# Patient Record
Sex: FEMALE | Race: WHITE
[De-identification: names, ages, dates, MRNs, and addresses within clinical notes are randomized per-mention and may not be internally consistent; named-entity substitution may affect disease eponyms.]

---

## 2017-04-04 ENCOUNTER — HOSPITAL ENCOUNTER (INPATIENT)
Dept: HOSPITAL 1 - ED | Age: 62
LOS: 2 days | Discharge: HOME | DRG: 691 | End: 2017-04-06
Attending: FAMILY MEDICINE | Admitting: FAMILY MEDICINE
Payer: MEDICAID

## 2017-04-04 VITALS — SYSTOLIC BLOOD PRESSURE: 143 MMHG | DIASTOLIC BLOOD PRESSURE: 78 MMHG

## 2017-04-04 VITALS — DIASTOLIC BLOOD PRESSURE: 75 MMHG | SYSTOLIC BLOOD PRESSURE: 136 MMHG

## 2017-04-04 VITALS — SYSTOLIC BLOOD PRESSURE: 136 MMHG | DIASTOLIC BLOOD PRESSURE: 75 MMHG

## 2017-04-04 VITALS — SYSTOLIC BLOOD PRESSURE: 140 MMHG | DIASTOLIC BLOOD PRESSURE: 77 MMHG

## 2017-04-04 VITALS — HEIGHT: 56 IN | BODY MASS INDEX: 23.9 KG/M2 | WEIGHT: 106.24 LBS

## 2017-04-04 VITALS — DIASTOLIC BLOOD PRESSURE: 71 MMHG | SYSTOLIC BLOOD PRESSURE: 132 MMHG

## 2017-04-04 VITALS — DIASTOLIC BLOOD PRESSURE: 86 MMHG | SYSTOLIC BLOOD PRESSURE: 151 MMHG

## 2017-04-04 VITALS — DIASTOLIC BLOOD PRESSURE: 74 MMHG | SYSTOLIC BLOOD PRESSURE: 135 MMHG

## 2017-04-04 VITALS — SYSTOLIC BLOOD PRESSURE: 133 MMHG | DIASTOLIC BLOOD PRESSURE: 84 MMHG

## 2017-04-04 VITALS — SYSTOLIC BLOOD PRESSURE: 151 MMHG | DIASTOLIC BLOOD PRESSURE: 86 MMHG

## 2017-04-04 DIAGNOSIS — R31.9: ICD-10-CM

## 2017-04-04 DIAGNOSIS — I12.9: ICD-10-CM

## 2017-04-04 DIAGNOSIS — N17.0: ICD-10-CM

## 2017-04-04 DIAGNOSIS — N39.0: ICD-10-CM

## 2017-04-04 DIAGNOSIS — E83.42: ICD-10-CM

## 2017-04-04 DIAGNOSIS — C83.39: Primary | ICD-10-CM

## 2017-04-04 DIAGNOSIS — E44.0: ICD-10-CM

## 2017-04-04 DIAGNOSIS — L97.519: ICD-10-CM

## 2017-04-04 DIAGNOSIS — B43.2: ICD-10-CM

## 2017-04-04 DIAGNOSIS — F32.9: ICD-10-CM

## 2017-04-04 DIAGNOSIS — Z94.0: ICD-10-CM

## 2017-04-04 DIAGNOSIS — Z90.5: ICD-10-CM

## 2017-04-04 DIAGNOSIS — D12.3: ICD-10-CM

## 2017-04-04 DIAGNOSIS — N18.9: ICD-10-CM

## 2017-04-04 DIAGNOSIS — K21.9: ICD-10-CM

## 2017-04-04 LAB
ALBUMIN SERPL-MCNC: 3 G/DL (ref 3.4–5)
ALP SERPL-CCNC: 92 U/L (ref 46–116)
ALT SERPL-CCNC: 19 U/L (ref 14–59)
AMPHETAMINES UR QL SCN: (no result)
AMYLASE SERPL-CCNC: 77 U/L (ref 25–115)
AST SERPL-CCNC: 14 U/L (ref 15–37)
BASOPHILS NFR BLD: 0.2 % (ref 0–2)
BILIRUB SERPL-MCNC: 0.29 MG/DL (ref 0.2–1)
BUN SERPL-MCNC: 32 MG/DL (ref 7–18)
CALCIUM SERPL-MCNC: 9 MG/DL (ref 8.5–10.1)
CHLORIDE SERPL-SCNC: 110 MMOL/L (ref 98–107)
CHOLEST SERPL-MCNC: 100 MG/DL (ref ?–200)
CHOLEST/HDLC SERPL: 2.9 MG/DL
CO2 SERPL-SCNC: 22.7 MMOL/L (ref 21–32)
CREAT SERPL-MCNC: 1.4 MG/DL (ref 0.6–1)
ERYTHROCYTE [DISTWIDTH] IN BLOOD BY AUTOMATED COUNT: 21 % (ref 11.5–14.5)
GFR SERPLBLD BASED ON 1.73 SQ M-ARVRAT: 40 ML/MIN
GLUCOSE SERPL-MCNC: 82 MG/DL (ref 74–106)
HDLC SERPL-MCNC: 34 MG/DL (ref 40–60)
IRON SERPL-MCNC: 43 UG/DL (ref 50–170)
LIPASE SERPL-CCNC: 222 IU/L (ref 73–393)
MAGNESIUM SERPL-MCNC: 1.5 MG/DL (ref 1.8–2.4)
MICROSCOPIC UR-IMP: YES
PHOSPHATE SERPL-MCNC: 2.9 MG/DL (ref 2.5–4.9)
PLATELET # BLD: 209 X10^3MCL (ref 130–400)
POTASSIUM SERPL-SCNC: 4.5 MMOL/L (ref 3.5–5.1)
PROT SERPL-MCNC: 5.7 G/DL (ref 6.4–8.2)
RBC # BLD AUTO: 2.24 M/MM3 (ref 4.1–5.1)
RBC # UR STRIP.AUTO: (no result) /UL
SODIUM SERPL-SCNC: 140 MMOL/L (ref 136–145)
T3 SERPL-MCNC: 0.89 NG/ML
T3RU NFR SERPL: 33 % UPTAKE (ref 30–39)
T4 FREE SERPL-MCNC: 1.2 NG/DL (ref 0.76–1.46)
T4 SERPL-MCNC: 7.1 UG/DL (ref 4.7–13.3)
T4/T3 UPTAKE INDEX SERPL: 2.3 UG/DL (ref 1.4–4.5)
TIBC SERPL-MCNC: 239 UG/DL (ref 250–450)
TRIGL SERPL-MCNC: 117 MG/DL (ref ?–150)
UA SPECIFIC GRAVITY: 1.01 (ref 1–1.03)

## 2017-04-04 PROCEDURE — P9016 RBC LEUKOCYTES REDUCED: HCPCS

## 2017-04-04 NOTE — NUR
RECEIVED PT FROM ER BY [facility] WITH ER NURSE AND PT'S . PT IS Nigerian
SPEAKING, A/O X4. PT NO COMPLAIN OF PAIN, REPORTED MILD DIZZINESS AT THIS
TIME. PT BREATHING ON RA, EVEN, UNLABORED. IV SITE PATENT, INTACT. WILL RESUME
IVF PER ORDER.

## 2017-04-04 NOTE — NUR
RECEIVED PT FROM ER BY [facility] WITH ER NURSE AND PT'S . PT IS Russian
SPEAKING, A/O X 4. PT NO COMPLAIN OF PAIN, REPORTED MILD DIZZINESS  AT THIS
TIME. PT BREATHING ON RA, EVEN, UNLABORED. IV SITE PATENT, INTACT. WILL RESUME
IVF PER ORDER.

## 2017-04-04 NOTE — NUR
RECEIVED 61 Y/O F FOR EVAL OF BLOOD IN STOOL THIS MORNING. PT STS HAD BRIGHT
RED BLOOD IN STOOL X3 TIMES THIS MORNING. DENIES PAIN DURING DEFECATION, DENIES
ABDOMINAL PAIN OR OTHER SYMPTOMS. ABDOMEN SOFT AND NONTENDER AT THIS TIME,
BOWEL SOUNDS ACTIVE X4 QUADRANTS. RESP E/U, NAD NOTED. PER DAUGHTER PT HAS
COLON CANCER BUT HAS NOT YET STARTED TREATMENT. AWAITING MSE.

## 2017-04-04 NOTE — NUR
RECEIVED PT LYING IN BED, AWAKE/ALERT AND VERBALLY RESPONSIVE. RESP IS EVEN
AND NOT LABORED, ON ROOM AIR. NO C/O DIFFICULTY BARETHING. NO C/O ANY PAIN. PT
IS SCHEDULED FOR COLONOSCOPY AND VOWEL PREP HAS STARTED. PT IS AWARE AND IS
DRINKING THE SOLUTION FOR THE BOWEL PREP. WILL CONTINUE TO MONITOR.

## 2017-04-04 NOTE — NUR
START 1ST UNIT BLOOD TRANSFUSION. PT IS AWAKE, ALERT, ORIENTED X 3. PT IS
EATING LUCN. PRE VS CHECKED: /75, HR 78 BPM, O2 % ON RA, TEMP
97.0.
PT HAD BM DIARRHEA, DARK STOOL WITH SOME BRIGHT RED NOTED. WILL CONTINUE TO
MONITOR.

## 2017-04-04 NOTE — NUR
PAGE GATE DR. PARDO ABOUT PT'S MG 1.5. WAITING FOR ORDER.
ORDER LAB [facility] AT 2300 TONIGHT FOR POST 2 UNITS BLOOD TRANSFUSION.
PT HAD BM, BRIGHT RED NOTED IN STOOL. IV SITE SALIN LOCK.

## 2017-04-04 NOTE — NUR
2ND UNIT BLOOD TRANSFUSION DONE. PT NO COMPLAIN OF DISCOMFORT. VS CHECKED:
TEMP 98.1, HR 75 BPM, /84, O2 SAT 99% ON RA. START BOWEL PREP FOR
TOMORROW COLONOSCOPY.

## 2017-04-04 NOTE — NUR
1ST UNIT BLOOD TRANSFUSION DONE. PT TOLERATE WELL. POST VS CHECKED: /71,
HR 76 BPM, TEMP 98.4, HR 76 BPM. WILL START 2 UNIT BLOOD TRANSUFSION PER
ORDER.

## 2017-04-04 NOTE — NUR
RECEIVED PT FROM NIGHT NURSE. PT IN STABLE CONDITION. RESP EVEN AND UNLABORED,
RA. VS STABLE AND WNL. NAD NOTED. NO C/O PAIN

## 2017-04-05 VITALS — DIASTOLIC BLOOD PRESSURE: 75 MMHG | SYSTOLIC BLOOD PRESSURE: 145 MMHG

## 2017-04-05 VITALS — DIASTOLIC BLOOD PRESSURE: 74 MMHG | SYSTOLIC BLOOD PRESSURE: 135 MMHG

## 2017-04-05 VITALS — DIASTOLIC BLOOD PRESSURE: 76 MMHG | SYSTOLIC BLOOD PRESSURE: 120 MMHG

## 2017-04-05 VITALS — SYSTOLIC BLOOD PRESSURE: 137 MMHG | DIASTOLIC BLOOD PRESSURE: 82 MMHG

## 2017-04-05 VITALS — DIASTOLIC BLOOD PRESSURE: 75 MMHG | SYSTOLIC BLOOD PRESSURE: 154 MMHG

## 2017-04-05 LAB
ALBUMIN SERPL-MCNC: 3.1 G/DL (ref 3.4–5)
BASOPHILS NFR BLD: 0.2 % (ref 0–2)
BASOPHILS NFR BLD: 0.2 % (ref 0–2)
BUN SERPL-MCNC: 19 MG/DL (ref 7–18)
CALCIUM SERPL-MCNC: 9.5 MG/DL (ref 8.5–10.1)
CHLORIDE SERPL-SCNC: 112 MMOL/L (ref 98–107)
CO2 SERPL-SCNC: 20.2 MMOL/L (ref 21–32)
CREAT SERPL-MCNC: 1.4 MG/DL (ref 0.6–1)
ERYTHROCYTE [DISTWIDTH] IN BLOOD BY AUTOMATED COUNT: 19.2 % (ref 11.5–14.5)
ERYTHROCYTE [DISTWIDTH] IN BLOOD BY AUTOMATED COUNT: 19.7 % (ref 11.5–14.5)
GFR SERPLBLD BASED ON 1.73 SQ M-ARVRAT: 40 ML/MIN
GLUCOSE SERPL-MCNC: 79 MG/DL (ref 74–106)
MAGNESIUM SERPL-MCNC: 2.1 MG/DL (ref 1.8–2.4)
PLATELET # BLD: 190 X10^3MCL (ref 130–400)
PLATELET # BLD: 199 X10^3MCL (ref 130–400)
POTASSIUM SERPL-SCNC: 4.5 MMOL/L (ref 3.5–5.1)
SODIUM SERPL-SCNC: 143 MMOL/L (ref 136–145)
TRANSFERRIN SERPL-MCNC: 206 MG/DL (ref 200–370)

## 2017-04-05 PROCEDURE — 0DBL8ZZ EXCISION OF TRANSVERSE COLON, VIA NATURAL OR ARTIFICIAL OPENING ENDOSCOPIC: ICD-10-PCS | Performed by: INTERNAL MEDICINE

## 2017-04-05 PROCEDURE — 0JBQ3ZX EXCISION OF RIGHT FOOT SUBCUTANEOUS TISSUE AND FASCIA, PERCUTANEOUS APPROACH, DIAGNOSTIC: ICD-10-PCS

## 2017-04-05 NOTE — NUR
MODERATE SEDATION CONSENT EXPLAINED TO THE PT WITH HELP IN TRANSLATION BY
CHAD CROOK. PT VERBALIZED UNDERSTANDING AND SIGNED.

## 2017-04-05 NOTE — NUR
PT BROUGHT DOWN TO GI LAB [facility]IA [facility] FOR THE SCHEDULED COLONOSCOPY
PT ALERT/AND VERBALLY RESPONSIVE.

## 2017-04-05 NOTE — NUR
(PODIATRY) CAME, EXPLAINED TO THE PT AND DID CONSENT FOR BIOPSY OF RT
HEEL SCAB WITH  PHONE AND STARTING PROCEDURE NOW WITH .
GAVE REPORT TO NEXT SHIFT NURSE STACI AND REQUESTED HER TO TAKE PICTURE
AFTER DOCTORS FINISH TAKE BIOPSY.

## 2017-04-05 NOTE — NUR
(PODIATRY) CAME AND DID BIOPSY OF RT HEEL SCAB .  ID CONSENT
WITH  PHONE AND DID BIOPSY AND SENT SPECIMEN TO LAB BY .

## 2017-04-05 NOTE — NUR
RECEIVED PATIENT IN BED AWAKE, ALERT AND ORIENTED Kyrgyz SPEAKING WITH NO
SIGN OF ACUTE DISTRESS NOTED. RESPIRATION EVENA NDNONLABOR WITH CLEAR BS
SATTING AT 98% RA. TELE#29 NSR ON MONITOR. SCD TO BLE IN PLACE. AV SHUNT LUE
WITH GOOD BRUIT AND THRILL. RT HEEL SCAB DRESSING CDI. WILL CONTINUE TO
MONITOR. CALL LIGHT WITHIN REACH.

## 2017-04-05 NOTE — NUR
RECEIVED PT IN BED.ASSESSED AND DOCUMENTED. GI STAFF CAME TO PICK PT AND WENT
TO GI LAB VIA [facility]UR[facility]. NIGHT NURSE GAVE REPORT TO GI NURSE ALREADY. VITAL SIGNS
STABLE.

## 2017-04-05 NOTE — NUR
PT IS SLEEPING. RESP IS EVEN AND NOT LABORED, ON ROOM AIR. IV IS [place]PL[place]. NO
DISTRESS NOTED. WILL CONTINUE TO MONITOR.

## 2017-04-05 NOTE — NUR
RECEIVED PT FROM GI LAB AFTER COLONOSCOPY. VITAL SIGNS STABLE. DENIES ANY
PAIN.SAFTEY PRECAUTIONS ON. WILL MONITOR.

## 2017-04-06 VITALS — SYSTOLIC BLOOD PRESSURE: 132 MMHG | DIASTOLIC BLOOD PRESSURE: 69 MMHG

## 2017-04-06 LAB
BASOPHILS NFR BLD: 0 % (ref 0–2)
BUN SERPL-MCNC: 22 MG/DL (ref 7–18)
CALCIUM SERPL-MCNC: 9.3 MG/DL (ref 8.5–10.1)
CHLORIDE SERPL-SCNC: 111 MMOL/L (ref 98–107)
CO2 SERPL-SCNC: 21.1 MMOL/L (ref 21–32)
CREAT SERPL-MCNC: 1.5 MG/DL (ref 0.6–1)
ERYTHROCYTE [DISTWIDTH] IN BLOOD BY AUTOMATED COUNT: 19.4 % (ref 11.5–14.5)
GFR SERPLBLD BASED ON 1.73 SQ M-ARVRAT: 37 ML/MIN
GLUCOSE SERPL-MCNC: 85 MG/DL (ref 74–106)
PLATELET # BLD: 203 X10^3MCL (ref 130–400)
POTASSIUM SERPL-SCNC: 4.9 MMOL/L (ref 3.5–5.1)
SODIUM SERPL-SCNC: 139 MMOL/L (ref 136–145)
URATE SERPL-MCNC: 7.4 MG/DL (ref 2.6–6)

## 2017-04-06 NOTE — NUR
AWAKE THIS TIME C/O PAIN TO RT FOOT AT SCALE OF 8/10 PER PATIENT MEDOCATED
WITH MORPHINE 2MG IVP AS PRESCRIBED. WILL CONTINUE TO MONITOR.

## 2017-04-06 NOTE — NUR
PHOTO OF ULCERS TO RT FOOT TAKEN AND DRESSING CHANGED. C/O RT FOOT PAIN X2
THROUGHOUT THE SHIFT AND MEDICATED AS PRESCRIBED. ALL NEEDS ATTENDED.

## 2017-04-06 NOTE — NUR
PT'S DAUGHTER IS HERE TO  PT.DISCHARGE INSTRUCTIONS GIVEN. PB SIGNED
AND SENT WITH PT.2 IV IN RFA AND TELE REMOVED. PT DENIES ANY PAIN. CNA TOOK PT
TO LOBBY VIA WHEELCHAIR ACCOMPANIED BY PT'S DAUGHTER. PT DC HOME.

## 2017-04-06 NOTE — NUR
RECEIVED PT IN BED.ASSESSED AND DOCUMENTED.  SPOKE WITH PT AND HE IS
DISCHARGING THE PT SOON. PT IS STABLE. DENIES ANY PAIN.

## 2017-08-30 ENCOUNTER — HOSPITAL ENCOUNTER (INPATIENT)
Dept: HOSPITAL 1 - ED | Age: 62
LOS: 6 days | Discharge: NURSING, REHAB OR LONG-TERM CARE | DRG: 254 | End: 2017-09-05
Attending: FAMILY MEDICINE | Admitting: FAMILY MEDICINE
Payer: MEDICAID

## 2017-08-30 VITALS — DIASTOLIC BLOOD PRESSURE: 57 MMHG | SYSTOLIC BLOOD PRESSURE: 109 MMHG

## 2017-08-30 VITALS — WEIGHT: 99.21 LBS | HEIGHT: 58 IN | BODY MASS INDEX: 20.82 KG/M2

## 2017-08-30 DIAGNOSIS — M62.50: ICD-10-CM

## 2017-08-30 DIAGNOSIS — I10: ICD-10-CM
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
## 2017-08-30 DIAGNOSIS — K21.9: ICD-10-CM

## 2017-08-30 DIAGNOSIS — E87.6: ICD-10-CM

## 2017-08-30 DIAGNOSIS — E87.1: ICD-10-CM

## 2017-08-30 DIAGNOSIS — E44.0: ICD-10-CM

## 2017-08-30 DIAGNOSIS — T38.6X5A: ICD-10-CM

## 2017-08-30 DIAGNOSIS — K92.81: Primary | ICD-10-CM

## 2017-08-30 DIAGNOSIS — E78.00: ICD-10-CM

## 2017-08-30 DIAGNOSIS — N17.0: ICD-10-CM

## 2017-08-30 DIAGNOSIS — E83.39: ICD-10-CM

## 2017-08-30 DIAGNOSIS — C83.39: ICD-10-CM

## 2017-08-30 DIAGNOSIS — E83.42: ICD-10-CM

## 2017-08-30 DIAGNOSIS — Z94.0: ICD-10-CM

## 2017-08-30 LAB
ALBUMIN SERPL-MCNC: 2.9 G/DL (ref 3.4–5)
ALP SERPL-CCNC: 85 U/L (ref 46–116)
ALT SERPL-CCNC: 18 U/L (ref 14–59)
AMYLASE SERPL-CCNC: 22 U/L (ref 25–115)
AST SERPL-CCNC: 8 U/L (ref 15–37)
BILIRUB SERPL-MCNC: 0.4 MG/DL (ref 0.2–1)
BUN SERPL-MCNC: 13 MG/DL (ref 7–18)
CALCIUM SERPL-MCNC: 8.9 MG/DL (ref 8.5–10.1)
CHLORIDE SERPL-SCNC: 100 MMOL/L (ref 98–107)
CHOLEST SERPL-MCNC: 100 MG/DL (ref ?–200)
CHOLEST/HDLC SERPL: 2.4 MG/DL
CK MB SERPL-MCNC: 0.5 NG/ML (ref 0–3.6)
CK SERPL-CCNC: 36 U/L (ref 26–192)
CO2 SERPL-SCNC: 17.7 MMOL/L (ref 21–32)
CREAT SERPL-MCNC: 1.2 MG/DL (ref 0.6–1)
CRP SERPL-MCNC: 13 MG/DL (ref ?–0.9)
DACRYOCYTES BLD QL SMEAR: (no result)
ERYTHROCYTE [DISTWIDTH] IN BLOOD BY AUTOMATED COUNT: 21.8 % (ref 11.5–14.5)
GFR SERPLBLD BASED ON 1.73 SQ M-ARVRAT: 48 ML/MIN
GLUCOSE SERPL-MCNC: 124 MG/DL (ref 74–106)
HDLC SERPL-MCNC: 41 MG/DL (ref 40–60)
LIPASE SERPL-CCNC: 74 IU/L (ref 73–393)
MAGNESIUM SERPL-MCNC: 1.2 MG/DL (ref 1.8–2.4)
MICROSCOPIC UR-IMP: YES
MONOCYTES NFR BLD: 63 % (ref 0–7)
NEUTS BAND NFR BLD: 4 % (ref 0–10)
NEUTS SEG NFR BLD MANUAL: 18 % (ref 37–75)
OVALOCYTES BLD QL SMEAR: (no result)
PATH REV BLD -IMP: YES
PHOSPHATE SERPL-MCNC: 1.8 MG/DL (ref 2.5–4.9)
PLAT MORPH BLD: (no result)
PLATELET # BLD: 57 X10^3MCL (ref 130–400)
POTASSIUM SERPL-SCNC: 3.3 MMOL/L (ref 3.5–5.1)
PROT SERPL-MCNC: 6.1 G/DL (ref 6.4–8.2)
RBC # UR STRIP.AUTO: NEGATIVE /UL
RBC MORPH BLD: (no result)
SODIUM SERPL-SCNC: 133 MMOL/L (ref 136–145)
T3RU NFR SERPL: 38 % UPTAKE (ref 30–39)
T4 FREE SERPL-MCNC: 1.34 NG/DL (ref 0.76–1.46)
T4 SERPL-MCNC: 9.5 UG/DL (ref 4.7–13.3)
T4/T3 UPTAKE INDEX SERPL: 3.6 UG/DL (ref 1.4–4.5)
TRIGL SERPL-MCNC: 118 MG/DL (ref ?–150)
UA SPECIFIC GRAVITY: 1 (ref 1–1.03)

## 2017-08-30 PROCEDURE — P9016 RBC LEUKOCYTES REDUCED: HCPCS

## 2017-08-30 NOTE — NUR
PT GIVEN MEDS PER DR BELTRE ORDERS. PT EDUCATED ON MEDS AND VERBALIZED
UNDERSTANDING OF TEACHING. PT DENIES ALLERGIES TO MEDS. PT AAOX4, BREATHING
EVEN AND UNLABORED. NO DISTRESS NOTED AT THIS TIME. PT PORT-CATH INTACT WITH NO
COMPLICATIONS NOTED AT THIS TIME.

## 2017-08-30 NOTE — NUR
PT CAME IN TO ED W/CC OF ABDNORMAL LAB RESULTS OF LOW WBC AS INFORMED BY PMD AS
WELL AS EPIGASTRIC ABD PAIN X1 DAY. PT AMBULATED TO [facility] W/STEADY GAIT. PT
HAS HX OF LFT BREAST MASECTOMY AND IS CURRENTLY BEING TREATED FOR CANCER IN THE
[facility]. PT RESPS E/U. NO S/S OF DISTRESS NOTED. COOLING MEASURES
IMPLEMENTED. PT ALSO REPORTS FEVER FOR "A FEW DAYS". PORT-A-CATH NOTED TO LFT
UPPER CHEST. DR. BELTRE AT BEDSIDE FOR MSE. WILL CONTINUE TO MONITOR.

## 2017-08-30 NOTE — NUR
DR GUTIERREZ IN THE ROOM SEEING THE PATIENT AND MADE AWARE OF PATIENT K+ LEVEL
3.3,WILL CONTINUE TO MONITOR.

## 2017-08-30 NOTE — NUR
PT IN BED WITH SIDERAILS UP FOR SAFETY. PT AAOX4, BREATHING EVEN AND UNLABORED.
NO DISTRESS NOTED AT THIS TIME. COMFORT MEASURES IN PLACED. CALL LIGHT PLACED
WITHIN REACH.

## 2017-08-30 NOTE — NUR
RECEIVED PT FROM ED VIA [facility], CAME IN DUE TO ABNORMAL LABS AND FEVER.
AAOX4. C/O DIZZINESS. NO SOB NOTED. DENIES CHEST PAIN/PRESSURE. DENIES
ABDOMINAL DISCOMFORT. PALE. PULSES ARE PALPABLE. W/ LEFT UPPER CHEST PORTACAT
ACCESSED IN ED, W/ GOOD BLOOD RETURN, DRESSING CDI. W/ SCATTERED PURPLISH
DISCOLORATION ON BUE. SIDE RAILS UPX2. CALL LIGHT ON REACH. ENDORSED

## 2017-08-31 VITALS — SYSTOLIC BLOOD PRESSURE: 119 MMHG | DIASTOLIC BLOOD PRESSURE: 64 MMHG

## 2017-08-31 VITALS — SYSTOLIC BLOOD PRESSURE: 122 MMHG | DIASTOLIC BLOOD PRESSURE: 62 MMHG

## 2017-08-31 VITALS — DIASTOLIC BLOOD PRESSURE: 62 MMHG | SYSTOLIC BLOOD PRESSURE: 122 MMHG

## 2017-08-31 VITALS — DIASTOLIC BLOOD PRESSURE: 53 MMHG | SYSTOLIC BLOOD PRESSURE: 103 MMHG

## 2017-08-31 VITALS — DIASTOLIC BLOOD PRESSURE: 62 MMHG | SYSTOLIC BLOOD PRESSURE: 116 MMHG

## 2017-08-31 VITALS — SYSTOLIC BLOOD PRESSURE: 123 MMHG | DIASTOLIC BLOOD PRESSURE: 71 MMHG

## 2017-08-31 LAB
ERYTHROCYTE [SEDIMENTATION RATE] IN BLOOD BY WESTERGREN METHOD: 107 MM/HR (ref 0–30)
T3 SERPL-MCNC: 0.65 NG/ML

## 2017-08-31 NOTE — NUR
PATIENT RESTING IN BED WITH FAMILY MEMBERS AT BEDSIDE, UPDATE PT'S CONDITION
AND POC PER DTR TRISTON REQUEST, DUE MEDS GIVEN. MAXIPIME 2 GM IVPB GIVEN,
VANCOMYCIN 125MG PO GIVEN. NORCO 1 TAB PO GIVEN. FOR LEGS/ABD PAIN 6/10. PAGE
DR HAMPTON FOR ORDER PATIENT C/O STOMACH ACIDIC. PER PATIENT TAKE PEPCID AT
HOME BUT STOP 2 WEEKS AGO.

## 2017-08-31 NOTE — NUR
PATIENT RESTING IN BED NO COMPLAINTS, EXPLAIN TO PATIENT FAMILY MEMBER NEED TO
PUT MASK,GOWN, AND GLOVES ON WHEN VISITING PATIENT TO PROTECT PATIENT FROM
INFECTION FOR NEUTROPENIC ISOLATION. ANNELIESE HELP WITH German TRANSLATION;
PER PATIENT AND FAMILY MEMBER VERBALIZE UNDERSTAND. DUE MEDS GIVEN. NEEDS
ANTICIPATED.

## 2017-08-31 NOTE — NUR
ASSIST PATIENT UP TO BSC SLIGHTLY WEAK BUT GAIT STEADY; VOIDED 450ML TEA
COLOR NOTED. BACK TO BED TOLERATED WELL. DR. HAMPTON PAGE AGAIN. WAITING TO
CALL BACK.

## 2017-08-31 NOTE — NUR
ASSIST PATIENT OFF BEDPAN VOIDED AND HAVE SMALL STOOL, UNABLE TO COLLECT
SPECIMEN D/T MIX WITH URINE. SHANIQUE CARE GIVEN. PATIENT ABLE TO TURN VERY WELL.
NEEDS ANTICIPATED. CONT TO MONITOR.

## 2017-08-31 NOTE — NUR
PT MADE COMFORTABLE IN BED AND NO CHANGE IN CONDITION AT THIS TIME,WILL
CONTINUE TO MONITOR,AND WILL CONTINUE TO MONITOR.

## 2017-08-31 NOTE — NUR
PATIENT RESTING IN BED NO DISTRESS NOTED, DENIES PAIN. ALL DUE MED GIVEN.
MAXIPIME IVPB GIVEN TO LEFT CHEST CLOTILDE-CATH INTACT AND PATENT. NEEDS
ATTENDED. CALL LIGHT WITHIN REACH; BSC NEXT TO BED. REMIND PATIENT NEED STOOL.

## 2017-08-31 NOTE — NUR
RECEIVED PATIENT AWAKE/ALERT IN BED NO DISTRESS NOTED, Ecuadorean SPEAKING ONLY
CNA SPEAK Ecuadorean TO PATIENT, DENIES PAIN AT THIS TIME. IV INTACT AND INFUSING
WELL. INFORM PATIENT NEED STOOL SAMPLE; ENC CALL FOR ASSISTANT. CALL LIGHT
WITHIN REACH.

## 2017-08-31 NOTE — NUR
PATIENT RESTING IN BED AWAKE/ALERT; NO DISTRESS NOTED. DR WASHBURN AND MEDICAL
TEAM ROUND DISCUSS POC AND CONSULT DR. MARIN FOR HD AND DR CONN FOR
INFECTION.

## 2017-09-01 VITALS — SYSTOLIC BLOOD PRESSURE: 128 MMHG | DIASTOLIC BLOOD PRESSURE: 67 MMHG

## 2017-09-01 VITALS — DIASTOLIC BLOOD PRESSURE: 67 MMHG | SYSTOLIC BLOOD PRESSURE: 138 MMHG

## 2017-09-01 VITALS — SYSTOLIC BLOOD PRESSURE: 122 MMHG | DIASTOLIC BLOOD PRESSURE: 69 MMHG

## 2017-09-01 VITALS — DIASTOLIC BLOOD PRESSURE: 80 MMHG | SYSTOLIC BLOOD PRESSURE: 128 MMHG

## 2017-09-01 VITALS — SYSTOLIC BLOOD PRESSURE: 137 MMHG | DIASTOLIC BLOOD PRESSURE: 70 MMHG

## 2017-09-01 LAB
BASOPHILS NFR BLD: 0 % (ref 0–2)
BUN SERPL-MCNC: 7 MG/DL (ref 7–18)
CALCIUM SERPL-MCNC: 8.9 MG/DL (ref 8.5–10.1)
CHLORIDE SERPL-SCNC: 112 MMOL/L (ref 98–107)
CO2 SERPL-SCNC: 20.4 MMOL/L (ref 21–32)
CREAT SERPL-MCNC: 0.9 MG/DL (ref 0.6–1)
DACRYOCYTES BLD QL SMEAR: (no result)
ERYTHROCYTE [DISTWIDTH] IN BLOOD BY AUTOMATED COUNT: 24.1 % (ref 11.5–14.5)
GFR SERPLBLD BASED ON 1.73 SQ M-ARVRAT: > 60 ML/MIN
GLUCOSE SERPL-MCNC: 82 MG/DL (ref 74–106)
MAGNESIUM SERPL-MCNC: 1.3 MG/DL (ref 1.8–2.4)
MONOCYTES NFR BLD: 64 % (ref 0–7)
NEUTS BAND NFR BLD: 0 % (ref 0–10)
NEUTS SEG NFR BLD MANUAL: 19 % (ref 37–75)
OVALOCYTES BLD QL SMEAR: (no result)
PHOSPHATE SERPL-MCNC: 1.4 MG/DL (ref 2.5–4.9)
PLAT MORPH BLD: (no result)
PLATELET # BLD: 105 X10^3MCL (ref 130–400)
POTASSIUM SERPL-SCNC: 3.7 MMOL/L (ref 3.5–5.1)
RBC MORPH BLD: (no result)
SODIUM SERPL-SCNC: 139 MMOL/L (ref 136–145)

## 2017-09-01 NOTE — NUR
THE PATIENT STATED THE BURNING IN STOMACH WAS BETTER AFTER TAKING NORCO 1 TAB
PO EARLIER IN AM AND THE THE PAIN WENT AWAY TOWARD THE END OF THE SHIFT. THE
PATIENT STATED THERE WAS NO MORE LOOSE STOOL SINCE AFTERNOON.
THE AV SHUNT TO LEFT UPPER ARM WITH BRUIT AND THIRLL BUT THE PATIENT DID NOT
HAVE HEMODIALYSIS ANY MORE.

## 2017-09-01 NOTE — NUR
LATE ENTRY:
THE CHARGE NURSE NOTIFIED DR. WALTON THAT THE PATIENT'S HEMOGLOBIN WAS 6.2 THIS
MORNING.

## 2017-09-01 NOTE — NUR
DR. WALTON AND THE TEAM WERE MAKING ROUND TO SEE THE PATIENT. THE CARE PLAN WAS
INFORMED TO THE PATIENT IN Belarusian VIA , DR. GEORGES-RESIDENT.
THE PATIENT VERBALIZED UNDERSTANDING.

## 2017-09-01 NOTE — NUR
ASSISTED PT TO USE BSC, PT HAD SMALL LOOSE STOOL BUT UNABLE TO COLLECT THE
SPECIMEN DUE TO MIX WITH PT'S URINE, DENIES ANY PAIN OR DISCOMFORT, NO
DISTRESS NOTED, WILL KEEP TO MONITOR.

## 2017-09-01 NOTE — NUR
REC'D PT FROM DAY NURSE.  AT BEDSIDE. NEUTROPENIC PREC IN PLACE. PT
AAOX4, SPEECH CLEAR, Afghan SPEAKING. ON TELE 19. DENIES CP DIZZINESS, OR
PALPITATIONS. NO SIGNS OF DISTRESS NOTED. BREATHING EVEN/UNLABORED ON RA.
DENIES RESP DISTRESS OR SOB.  NO EDEMA NOTED. DENIES ABD PAIN, TENDERNESS, OR
N/V. VOIDING FREELY USING BSC.  LUE SHUNT, BRUIT/THRILL PRESENT. RUE SHUNT,
MILD BRUIT/THRILL.  MILD GEN WEAKNESS. USES CANE/WALKER AT HOME. SOME
ECCHYMOSIS TO BUE. DENIES PAIN AT THIS TIME. NS TO L CHEST PORT A CATH PATENT
AND INFUSING NS @ 70 ML/HR, SITE WNL. CALL LIGHT WITHIN REACH, BED AT LOWEST
POSITION. WILL CONTINUE TO MONITOR.

## 2017-09-01 NOTE — NUR
RECEIVED THE PATIENT AWAKE AND ORIENTED TO PERSON, PLACE AND TIME.
PATIENT DENIED SHORTNESS OF BREATH OR NAUSEA/VOMITING.
PATIENT STATED HAVING SOME MILD BURNING IN STOMACH, BUT TOLERABLE AT THIS
TIME.
WILL CONTINUE TO MONITOR AND MEDICATE FOR PAIN PRN.
IVF NS VIA H/L TO CLOTILDE CATH AT LEFT CHEST WALL.
TELE # 19 READS SINUS RHYTHMS.
CALL LIGHT WITHIN REACH.
SIDE RAILS UP X3.
NEUTROPENIC ISOLATION MAINTAINED DUE TO LOW WBC 0.8.

## 2017-09-01 NOTE — NUR
PT AWAKE AND RESTING IN BED, SLEPT MOST OF NIGHT, IVF INFUSING WELL TO LEFT
CHEST CLOTILDE CATH, DRESSING TO CLOTILDE CATH C/D/I, STILL WITH GENERALIZED
WEAKNESS WITH ASSIST TO USE BSC, GARY ONE TIME LOOSE BM DURING THE SHIFT, NO
DISTRESS NOTED, WILL KEEP TO MONITOR.

## 2017-09-02 VITALS — SYSTOLIC BLOOD PRESSURE: 144 MMHG | DIASTOLIC BLOOD PRESSURE: 79 MMHG

## 2017-09-02 VITALS — DIASTOLIC BLOOD PRESSURE: 74 MMHG | SYSTOLIC BLOOD PRESSURE: 125 MMHG

## 2017-09-02 VITALS — SYSTOLIC BLOOD PRESSURE: 139 MMHG | DIASTOLIC BLOOD PRESSURE: 80 MMHG

## 2017-09-02 VITALS — DIASTOLIC BLOOD PRESSURE: 81 MMHG | SYSTOLIC BLOOD PRESSURE: 140 MMHG

## 2017-09-02 VITALS — SYSTOLIC BLOOD PRESSURE: 131 MMHG | DIASTOLIC BLOOD PRESSURE: 80 MMHG

## 2017-09-02 LAB
BASOPHILS NFR BLD: 0 % (ref 0–2)
BUN SERPL-MCNC: 8 MG/DL (ref 7–18)
CALCIUM SERPL-MCNC: 9.4 MG/DL (ref 8.5–10.1)
CHLORIDE SERPL-SCNC: 111 MMOL/L (ref 98–107)
CO2 SERPL-SCNC: 19.1 MMOL/L (ref 21–32)
CREAT SERPL-MCNC: 1 MG/DL (ref 0.6–1)
DACRYOCYTES BLD QL SMEAR: (no result)
DACRYOCYTES BLD QL SMEAR: (no result)
ERYTHROCYTE [DISTWIDTH] IN BLOOD BY AUTOMATED COUNT: 21.5 % (ref 11.5–14.5)
GFR SERPLBLD BASED ON 1.73 SQ M-ARVRAT: 60 ML/MIN
GLUCOSE SERPL-MCNC: 87 MG/DL (ref 74–106)
MAGNESIUM SERPL-MCNC: 1.9 MG/DL (ref 1.8–2.4)
MONOCYTES NFR BLD: 63 % (ref 0–7)
MONOCYTES NFR BLD: 64 % (ref 0–7)
NEUTS BAND NFR BLD: 0 % (ref 0–10)
NEUTS SEG NFR BLD MANUAL: 20 % (ref 37–75)
NEUTS SEG NFR BLD MANUAL: 21 % (ref 37–75)
OVALOCYTES BLD QL SMEAR: (no result)
OVALOCYTES BLD QL SMEAR: (no result)
PHOSPHATE SERPL-MCNC: 2.4 MG/DL (ref 2.5–4.9)
PLAT MORPH BLD: (no result)
PLAT MORPH BLD: (no result)
PLATELET # BLD: 144 X10^3MCL (ref 130–400)
PLATELET # BLD: 146 X10^3MCL (ref 130–400)
POTASSIUM SERPL-SCNC: 3.9 MMOL/L (ref 3.5–5.1)
RBC MORPH BLD: (no result)
RBC MORPH BLD: (no result)
SCHISTOCYTES BLD QL SMEAR: (no result)
SODIUM SERPL-SCNC: 140 MMOL/L (ref 136–145)

## 2017-09-02 NOTE — NUR
PRBC COMPLETED. B/P= 138/76, P= 105, R.R.= 18, T= 99.0, O2 SAT.= 100% (RA).
NO TRANSFUSION RXN NOTED.

## 2017-09-02 NOTE — NUR
PT AWAKE, RESTING IN BED. NO SIGNS OF DISTRESS NOTED. BREATHING EVEN/UNLABORED
ON RA. DENIES PAIN OR DISCOMFORT AT THIS TIME. RETRIEVED HOME MED EVERALIMUS.
WILL GIVE TO PHARMACY TO BE VERIFIED. NO SIGNIFICANT CHANGES DURING SHIFT.
CALL LIGHT WITHIN REACH, BED AT LOWEST POSITION. WILL ENDORSE TO DAY NURSE.

## 2017-09-02 NOTE — NUR
PT RESTING IN BED WITH EYES CLOSED.  AT BEDSIDE. LAYING ON R SIDE. NO
SIGNS OF DISTRESS NOTED. BREATHING EVEN/UNLABORED ON RA. CALL LIGHT WITHIN
REACH, BED AT LOWEST POSITION. WILL CONTINUE TO MONITOR.

## 2017-09-02 NOTE — NUR
RECEIVED PT. IN BED A/A/O X4. NO SOB, NO N/V NOTED. DENIES ANY PAIN AT THIS
TIME. NS RUNNING AT 70 CC/HR. VIA CLOTILDE CATH. AT L CHEST. SCD APPLIED TO BLE.
PT. IS ON NEUTROPENIC PRECAUTION. AV SHUNT NOTED TO L UPPER ARM. OLD AV SHUNT
NOTED TO R UPPER ARM. BED IN LOW POS., CALL LIGHT WITHIN REACH. SIDE RAILS UP
X3.

## 2017-09-02 NOTE — NUR
DR. WASHBURN, THE RESIDENTS, CHARGE NURSE, AND ATTENDING NURSE AT BEDSIDE.
CAREPLAN DISCUSSED WITH PT. ALL QUESTIONS ANSWERED.

## 2017-09-02 NOTE — NUR
SHIFT REASSESSMENT DONE.PATIENT ALERT AND ORIENTED.Pashto,NEEDS MET,FAMILY AT
BEDSIDE,SUPPORTIVE OF CARE.PUT ON REVERSE/NEUTROPNIC PRECAUTION ,WBC
LOW.BREATHING EASY.NS AT 70 CC/ HOUR L CHEST PORTACATH,HAD PRBC TODAY FROM
PREVIOUS SHIFT.TELE 19 SR.BUE ECCHYMOSIS NOTED.SCD ORDERED.OLD SHUNT RUE GOOD
BRUIT BUT SHE DO NOT USE IT ANYMORE.CHEMO Q WEEK.,BREAST L MASTECTOMY HX.CALL
LIGHT IN REACH.

## 2017-09-03 VITALS — SYSTOLIC BLOOD PRESSURE: 137 MMHG | DIASTOLIC BLOOD PRESSURE: 83 MMHG

## 2017-09-03 VITALS — SYSTOLIC BLOOD PRESSURE: 156 MMHG | DIASTOLIC BLOOD PRESSURE: 81 MMHG

## 2017-09-03 VITALS — DIASTOLIC BLOOD PRESSURE: 83 MMHG | SYSTOLIC BLOOD PRESSURE: 144 MMHG

## 2017-09-03 VITALS — DIASTOLIC BLOOD PRESSURE: 84 MMHG | SYSTOLIC BLOOD PRESSURE: 147 MMHG

## 2017-09-03 VITALS — DIASTOLIC BLOOD PRESSURE: 83 MMHG | SYSTOLIC BLOOD PRESSURE: 146 MMHG

## 2017-09-03 LAB
BASOPHILS NFR BLD: 0 % (ref 0–2)
BUN SERPL-MCNC: 8 MG/DL (ref 7–18)
CALCIUM SERPL-MCNC: 9.7 MG/DL (ref 8.5–10.1)
CHLORIDE SERPL-SCNC: 110 MMOL/L (ref 98–107)
CO2 SERPL-SCNC: 19.2 MMOL/L (ref 21–32)
CREAT SERPL-MCNC: 0.9 MG/DL (ref 0.6–1)
DACRYOCYTES BLD QL SMEAR: (no result)
ERYTHROCYTE [DISTWIDTH] IN BLOOD BY AUTOMATED COUNT: 21.4 % (ref 11.5–14.5)
GFR SERPLBLD BASED ON 1.73 SQ M-ARVRAT: > 60 ML/MIN
GLUCOSE SERPL-MCNC: 111 MG/DL (ref 74–106)
MONOCYTES NFR BLD: 61 % (ref 0–7)
NEUTS BAND NFR BLD: 0 % (ref 0–10)
NEUTS SEG NFR BLD MANUAL: 22 % (ref 37–75)
OVALOCYTES BLD QL SMEAR: (no result)
PHOSPHATE SERPL-MCNC: 3.5 MG/DL (ref 2.5–4.9)
PLAT MORPH BLD: (no result)
PLATELET # BLD: 155 X10^3MCL (ref 130–400)
POTASSIUM SERPL-SCNC: 4.4 MMOL/L (ref 3.5–5.1)
RBC MORPH BLD: (no result)
SODIUM SERPL-SCNC: 138 MMOL/L (ref 136–145)

## 2017-09-03 NOTE — NUR
RECEIVED PATIENT SITTING UP IN BED A/O X4, Kazakh SPEAKING, CLEAR SPEECH, NO
NEURO DEFICITS NOTED. TELE # 19 IN PLACE, DENIES CHEST PAIN. BREATHING EVEN
UNLABBORED ON RA, DENIES SOB. PATIENT WITH CLOTILDE CATH TO LEFT UPPER CHEST CDI
INFUSING NS AT 70 ML/HR FREE FROM REDNESS AND INFILTRATION. PATIENT DENIES ANY
PAIN. IS CALM AND COOPERATIVE WITH CARE. ON NEUTROPENIC PRECAUTIONS.
INSTRUCTED TO CALL FOR ASSISTANCE IF NEEDED. CALL LIGHT WITHIN REACH, BED IN
LOW POSITION. WILL MONITOR.

## 2017-09-03 NOTE — NUR
REC'D PT FROM DAY NURSE. NEUTROPENIC PREC IN PLACE. AAOX4, SPEECH CLEAR,
FOLLOWS COMMANDS, Faroese SPEAKING. NO SIGNS OF DISTRESS NOTED. BREATHING
EVEN/UNLABORED ON RA. ON TELE 19. DENIES CP, DIZZINESS, OR PALPITATIONS. NO
EDEMA NOTED. REPORTS INT EPIGASTRIC PAIN, NONE AT THIS TIME. BOWEL SOUNDS
ACTIVE. ABD SOFT/FLAT. DENIES N/V. VOIDING FREELY USING BSC. OLD NONFUNCTIONAL
SHUNT TO RUE. WORKING SHUNT TO LUE, BRUIT/THRILL PRESENT. MILD GEN WEAKNESS,
AMBULATORY. ECCHYMOSIS TO BUE. L CHEST PORT-A-CATH PATENT AND INFUSING WELL.
CALL LIGHT WITHIN REACH, BED AT LOWEST POSITION. WILL CONTINUE TO MONITOR.

## 2017-09-03 NOTE — NUR
PATIENT SEEN RESTING IN BED COMFORTABLY, NO DISTRESS NOTED. DUE MEDS GIVEN,
TOLERATED WELL. ALL NEEDS ATTENDED TO. WILL MONITOR.

## 2017-09-03 NOTE — NUR
ROUNDS MADE- DR. WASHBURN, RESIDENT TEAM, CHARGE NURSE AND PRIMARY NURSE AT
BEDSIDE. POC REVIEWED WITH PATIENT- MD SPOKE WITH INFECTIOUS DISEASE (DR. CONN) AND STATED PATIENT WILL NEED TO STAY FOR ANOTHER 1-2 DAYS FOR HER
NEUTROPHIL COUNT TO INCREASE. ALL QUESTIONS AND CONCERNS ADDRESSED. WILL
MONITOR.

## 2017-09-03 NOTE — NUR
PATIENT SITTING UP IN BED EATING DINNER, NO DISTRESS NOTED. ALL NEEDS ATTENDED
TO. NEUTROPENIC PRECAUTIONS MAINTAINED. WILL MONITOR.

## 2017-09-04 VITALS — SYSTOLIC BLOOD PRESSURE: 120 MMHG | DIASTOLIC BLOOD PRESSURE: 72 MMHG

## 2017-09-04 VITALS — SYSTOLIC BLOOD PRESSURE: 128 MMHG | DIASTOLIC BLOOD PRESSURE: 71 MMHG

## 2017-09-04 VITALS — DIASTOLIC BLOOD PRESSURE: 69 MMHG | SYSTOLIC BLOOD PRESSURE: 113 MMHG

## 2017-09-04 VITALS — DIASTOLIC BLOOD PRESSURE: 72 MMHG | SYSTOLIC BLOOD PRESSURE: 127 MMHG

## 2017-09-04 VITALS — SYSTOLIC BLOOD PRESSURE: 139 MMHG | DIASTOLIC BLOOD PRESSURE: 72 MMHG

## 2017-09-04 VITALS — DIASTOLIC BLOOD PRESSURE: 67 MMHG | SYSTOLIC BLOOD PRESSURE: 111 MMHG

## 2017-09-04 LAB
ERYTHROCYTE [DISTWIDTH] IN BLOOD BY AUTOMATED COUNT: 22.2 % (ref 11.5–14.5)
MONOCYTES NFR BLD: 36 % (ref 0–7)
NEUTS SEG NFR BLD MANUAL: 56 % (ref 37–75)
PLAT MORPH BLD: (no result)
PLATELET # BLD: 182 X10^3MCL (ref 130–400)
RBC MORPH BLD: (no result)

## 2017-09-04 NOTE — NUR
PT C/O DIZZINESS. LAYING IN BED. DENIES OTHER DISCOMFORT. /78, HR 91.
DR. NOEL MADE AWARE.  INSTRUCTED TO GIVE ZOFRAN. WILL GIVE PER ORDER.

## 2017-09-04 NOTE — NUR
RECEIVED PATIENT AWAKE/ALERT IN BED NO DISTRESS NOTED, DENIES PAIN AT THIS
TIME. LEFT CHEST CLOTILDE-CATH W/ IVF INFUSING WELL; HEMA SHUNT (+) THRILL/BRUIT.
POC EXPLAINED. CONT TO MONITOR.

## 2017-09-04 NOTE — NUR
PT RESTING IN BED WITH EYES CLOSED. LAYING ON L SIDE. NO SIGNS OF PAIN OR
DIZZINESS. BREATHING EVEN/UNLABORED ON RA. CALL LIGHT WITHIN REACH, BED AT
LOWEST POSITION.  WILL CONTINUE TO MONITOR.

## 2017-09-04 NOTE — NUR
Initial Nutrition Assessment
 
Dx: Fever, severe anemia, gastric lymphoma
PMHx: Gastric Lymphoma, currently undergoing chemotherapy, last on 17.
ESRD s/p renal transplant, HTN, HLD,GERD
PSHx: , B/L nephrectomy in aprox , LUE Dialysis shunt, R Renal
x-plant in approx .
Labs: (9/3) BH, H/H: 8.8/26L, BUN:8, Cr:0.9 () Alb:2.9L,
Meds: Colace, Lactinex, Neutra-phos, Prednisone, Prilosec, Rocaltrol, NS IV,
Solumedrol, Zofran,
Diet:Regular
PO Intake: () B:50% L:50% () B:30% L:80%, D:100% () D:10% (9/3)
B:20% L:60% D:40% () B:90% L:90%
Ht: 58in, 4'10   Wt: 99#, 45kg   BMI: 20.7kg/m2 (normal weight)
IBW: 96#,44kg    %IBW: 103%   UBW:120# per last RD assessment (2017)
Wt hx: (2017) 115#, 52kg
Age:61 y/o female
Food Allergies:
Skin: right foot: 2 lesions, 1.5cm diameter, 0.5cm diameter reported
metastases. Scattered ecchymosis BUE  Juan Alberto: 20
Edema:None
GI: Last BM:9/3
Pt had been c/o moderate epigastric pain which has been constant for the past
5 days as well as having been sent by her clinic for abnormal lab values. Pt
was then admitted with Neutropenic Fever on Chemotherapy, SIRS, Unknown
source, per H&P. Per progress 9/3, pt had no acute events overnight. Pt's
absolute neutrophil count has decreased since yesterday from 210 to 154, will
reassess with morning labs, Hgb has improved with 1 unit PRBC 6.8 to 8.7. Dr. Campbell recommends absolute neutrophil count to be more than 500 before pt can
be discharged.  Portacath located on left chest wall. IVF @ 70ml/hr. Pt
reports to feeling better since yesterday and admits to a slight abdominal
pain when she takes her meds which she accounts to not having much in her
stomach due to low appetite. Pt's appetite improved slightly, but is still not
consuming more than 20% of her meals. During visit, observed pt eating lunch.
Pt reports fair to good appetite with no GI issues.
 
 Problem with:  N:No V:No  D:No    C:No
 Problems with: Chewing:No  Swallowing: No
 Current appetite: fair to good
Recent wt change:21# wt loss x 8 months  %wt change:17.5%, severe
Vitamin/Supplement use:MVI and Vit D
Special diet at home:No
Physical activity:No
Education: Pt declined nutrition education
 
 Estimated Nutritional Needs Based on actual body weight 45kg
 Energy: 1350-1575kcal/d  (30-35kcal/kg for gastric lymphoma undergoing
treatment))
 Protein:54-67g/d    (1.2-1.5g/kg for gastric lymphoma undergoing treatment)
 Fluid: 1350-1575ml/d  (1 ml/kcal) or per doctor
 
Nutrition Diagnosis
1. Increased nutrient needs related to increased metabolic demands related to
pt with Gastric lymphoma undergoing chemotherapy.
2. Unintentional wt loss related to possibly gastric lymphoma as evidenced by
21# wt loss and 17.5% wt change in the past 8 months.
Intervention
 1.Recommend adding Boost Plus BID (provides 720kcal and 28g pro)
 
Monitor/Evaluate
 Goal: PO intake at least 75% of estimated needs
 Monitor: PO intake, Labs, GI function
 F/U in 3-5 days as moderate risk:-

## 2017-09-04 NOTE — NUR
CLARIFIED SOLUMEDROL ORDER INFORM DR. NGUYEN DOSE ALREADY GIVEN AT 1000. PER
DOCTOR CANCEL DOSE AT 1500.

## 2017-09-04 NOTE — NUR
Send patient's own med ( Prograf) to pharmacy to be vertify. Patient resting
in bed no complaint. Needs anticipated.

## 2017-09-04 NOTE — NUR
REC'D PT FROM DAY NURSE. AAOX4. LAYING IN BED COMFORTABLY. DENIES PAIN AT THIS
TIME. NO ACUTE DISTRESS NOTED. TELE #19. LUNG SOUNDS ARE CTA. BREATHING EVEN
AND UNLABORED. NEUTROPENIC IN PLACE. ABD IS ACTIVE X4. NO EDEMA NOTED. L CLOTILDE
CATH IN PLACE AND PATENT INFUSING 70 ML/HR. HEMA SHUNT WITH THRILL/BRUIT NOTED.
FAMILY AT BEDSIDE. BED IN LOWEST POSITION. CALL LIGHT WITHIN REACH. WILL
PROCEED TO PLAN OF CARE.

## 2017-09-04 NOTE — NUR
PT RESTING IN BED. NO COMPLAINTS AT THIS TIME. NO SIGNFICANT CHANGES DURING
SHIFT. NEUTROPENIC PRECAUTIONS IN PLACE. PORT A CATH INFUSING WELL. CALL LIGHT
WITHIN REACH, BED AT LOWEST POSITION. WILL ENDORSE TO DAY NURSE.

## 2017-09-04 NOTE — NUR
PATIENT RESTING IN BED NO COMPLAINTS, ALL DUE MEDS GIVEN, MAXIPIME 2 GM IVPB
GIVEN. PATIENT REFUSED ASA/COLACE. ASSIST UP BSC VOIDED AND BM. BACK TO BED.
FAMILY MEMBER AT BEDSIDE. NEEDS ANTICIPATED. WBC 1.8 DOCTOR AWARE.

## 2017-09-05 VITALS — SYSTOLIC BLOOD PRESSURE: 132 MMHG | DIASTOLIC BLOOD PRESSURE: 74 MMHG

## 2017-09-05 VITALS — DIASTOLIC BLOOD PRESSURE: 74 MMHG | SYSTOLIC BLOOD PRESSURE: 132 MMHG

## 2017-09-05 VITALS — DIASTOLIC BLOOD PRESSURE: 66 MMHG | SYSTOLIC BLOOD PRESSURE: 130 MMHG

## 2017-09-05 LAB
BASOPHILS NFR BLD: 0 % (ref 0–2)
ERYTHROCYTE [DISTWIDTH] IN BLOOD BY AUTOMATED COUNT: 22.7 % (ref 11.5–14.5)
MONOCYTES NFR BLD: 21 % (ref 0–7)
NEUTS BAND NFR BLD: 8 % (ref 0–10)
NEUTS SEG NFR BLD MANUAL: 59 % (ref 37–75)
OVALOCYTES BLD QL SMEAR: (no result)
PLAT MORPH BLD: (no result)
PLATELET # BLD: 157 X10^3MCL (ref 130–400)
RBC MORPH BLD: (no result)
SCHISTOCYTES BLD QL SMEAR: (no result)

## 2017-09-05 NOTE — NUR
D/C HARGROVE NEEDLE WITH HEPARIN FLUSH 100 UNITS AS ORDERED; DRESSING APPLIED TO
LEFT CHEST; NO BLEEDING NOTED. WAITING FOR FAMILY MEMBER.

## 2017-09-05 NOTE — NUR
PATIENT RESTING IN BED HOB ELEVATED, ALL DUE MEDS GIVEN, MAXIPIME IVPB GIVEN,
ASKING PATIENT DOSE OF PREDNISONE; PER PATIENT TAKING 2.5MG DAILY IN THE
MORNING; DR. NGUYEN WAS INFORM AND PHARMACY AWARE NEW ORDER. NEEDS
ANTICIPATED.

## 2017-09-05 NOTE — NUR
PT SLEPT THROUGHOUT THE SHIFT. NO ACUTE DISTRESS OR SIGNIFICANT CHANGES NOTED.
PT STATED TO HAVE 4 WATERY STOOL. BREATHING EVEN AND UNLABORED. NEUTROPENIC IN
PLACE. IV INTACT AND PATENT INFUSING 70 ML/HR. WILL ENDORSE ALL CONTINUITY
CARE TO ONCOMING NURSE.

## 2017-09-05 NOTE — NUR
PT RESTING WITH EYES CLOSED. NO SIGNS OF DISTRESS NOTED. BREATHING EVEN AND
UNLABORED. IV INTACT AND PATENT. WILL CONT TO MONITOR.

## 2017-09-05 NOTE — NUR
PATIENT RESTING IN BED WITH FAMILY MEMBERS AT BEDSIDE, UPDATE DTR PATIENT IS
DISCHARGE THIS AFTERNOON. VANCOMYCIN 750MG IVPB GIVEN. NEEDS ANTICIPATED.

## 2017-09-05 NOTE — NUR
PATIENT RESTING IN BED NO COMPLAINT, DISCHARGE INSTRUCTION AND PRESCRIPTIONS
EXPLAINED TO PATIENT WITH Japanese TRANSLATION ( BY FANG) AND DR GEORGES
AT BEDSIDE RE F/U APPT 9/5/17 @ 5PM AND 9/8/17 WITH ONCOLOGY. PATIENT
VERBALIZE UNDERSTAND. WAITING FOR FAMILY MEMBER FOR PICK.

## 2017-09-05 NOTE — NUR
RECEIVED PATIENT AWAKE/ALERT IN BED NO DISTRESS NOTED, DENIES PAIN; PATIENT
STATE HAS 4 TIMES DIARRHEA LAST NIGHT. IV INTACT AND INFUSING WELL. NEEDS
ATTENDED. CALL LIGHT WITHIN REACH, CONT TO MONITOR.

## 2017-09-21 ENCOUNTER — HOSPITAL ENCOUNTER (INPATIENT)
Dept: HOSPITAL 1 - ED | Age: 62
LOS: 3 days | Discharge: HOME | DRG: 720 | End: 2017-09-24
Attending: FAMILY MEDICINE | Admitting: FAMILY MEDICINE
Payer: MEDICAID

## 2017-09-21 VITALS — BODY MASS INDEX: 19.27 KG/M2 | HEIGHT: 60 IN | WEIGHT: 98.17 LBS

## 2017-09-21 VITALS — DIASTOLIC BLOOD PRESSURE: 67 MMHG | SYSTOLIC BLOOD PRESSURE: 116 MMHG

## 2017-09-21 DIAGNOSIS — N17.0: ICD-10-CM

## 2017-09-21 DIAGNOSIS — Z94.0: ICD-10-CM

## 2017-09-21 DIAGNOSIS — B96.5: ICD-10-CM

## 2017-09-21 DIAGNOSIS — A41.9: Primary | ICD-10-CM

## 2017-09-21 DIAGNOSIS — N18.6: ICD-10-CM

## 2017-09-21 DIAGNOSIS — I12.0: ICD-10-CM

## 2017-09-21 DIAGNOSIS — E87.1: ICD-10-CM

## 2017-09-21 DIAGNOSIS — N39.0: ICD-10-CM

## 2017-09-21 DIAGNOSIS — C83.39: ICD-10-CM

## 2017-09-21 DIAGNOSIS — K21.9: ICD-10-CM

## 2017-09-21 DIAGNOSIS — R65.20: ICD-10-CM

## 2017-09-21 DIAGNOSIS — E44.0: ICD-10-CM

## 2017-09-21 LAB
ALBUMIN SERPL-MCNC: 3.2 G/DL (ref 3.4–5)
ALP SERPL-CCNC: 94 U/L (ref 46–116)
ALT SERPL-CCNC: 16 U/L (ref 14–59)
AMYLASE SERPL-CCNC: 40 U/L (ref 25–115)
AST SERPL-CCNC: 12 U/L (ref 15–37)
BASOPHILS NFR BLD: 0 % (ref 0–2)
BILIRUB SERPL-MCNC: 0.5 MG/DL (ref 0.2–1)
BUN SERPL-MCNC: 13 MG/DL (ref 7–18)
CALCIUM SERPL-MCNC: 9.7 MG/DL (ref 8.5–10.1)
CHLORIDE SERPL-SCNC: 101 MMOL/L (ref 98–107)
CO2 SERPL-SCNC: 20.7 MMOL/L (ref 21–32)
CREAT SERPL-MCNC: 1.1 MG/DL (ref 0.6–1)
DACRYOCYTES BLD QL SMEAR: (no result)
ERYTHROCYTE [DISTWIDTH] IN BLOOD BY AUTOMATED COUNT: 23.3 % (ref 11.5–14.5)
GFR SERPLBLD BASED ON 1.73 SQ M-ARVRAT: 53 ML/MIN
GLUCOSE SERPL-MCNC: 90 MG/DL (ref 74–106)
LIPASE SERPL-CCNC: 133 IU/L (ref 73–393)
MICROSCOPIC UR-IMP: YES
MONOCYTES NFR BLD: 54 % (ref 0–7)
NEUTS BAND NFR BLD: 6 % (ref 0–10)
NEUTS SEG NFR BLD MANUAL: 28 % (ref 37–75)
OVALOCYTES BLD QL SMEAR: (no result)
PATH REV BLD -IMP: YES
PLAT MORPH BLD: (no result)
PLATELET # BLD: 85 X10^3MCL (ref 130–400)
POTASSIUM SERPL-SCNC: 3.6 MMOL/L (ref 3.5–5.1)
PROT SERPL-MCNC: 6.7 G/DL (ref 6.4–8.2)
RBC # UR STRIP.AUTO: (no result) /UL
RBC MORPH BLD: (no result)
SCHISTOCYTES BLD QL SMEAR: (no result)
SODIUM SERPL-SCNC: 134 MMOL/L (ref 136–145)
T4 SERPL-MCNC: 11.3 UG/DL (ref 4.7–13.3)
UA SPECIFIC GRAVITY: <=1.005 (ref 1–1.03)

## 2017-09-22 VITALS — SYSTOLIC BLOOD PRESSURE: 125 MMHG | DIASTOLIC BLOOD PRESSURE: 69 MMHG

## 2017-09-22 VITALS — DIASTOLIC BLOOD PRESSURE: 69 MMHG | SYSTOLIC BLOOD PRESSURE: 116 MMHG

## 2017-09-22 VITALS — SYSTOLIC BLOOD PRESSURE: 116 MMHG | DIASTOLIC BLOOD PRESSURE: 47 MMHG

## 2017-09-22 VITALS — DIASTOLIC BLOOD PRESSURE: 71 MMHG | SYSTOLIC BLOOD PRESSURE: 118 MMHG

## 2017-09-22 VITALS — SYSTOLIC BLOOD PRESSURE: 110 MMHG | DIASTOLIC BLOOD PRESSURE: 54 MMHG

## 2017-09-22 LAB
BUN SERPL-MCNC: 17 MG/DL (ref 7–18)
CALCIUM SERPL-MCNC: 9 MG/DL (ref 8.5–10.1)
CHLORIDE SERPL-SCNC: 108 MMOL/L (ref 98–107)
CO2 SERPL-SCNC: 19.3 MMOL/L (ref 21–32)
CREAT SERPL-MCNC: 1.1 MG/DL (ref 0.6–1)
DACRYOCYTES BLD QL SMEAR: (no result)
ERYTHROCYTE [DISTWIDTH] IN BLOOD BY AUTOMATED COUNT: 23.2 % (ref 11.5–14.5)
GFR SERPLBLD BASED ON 1.73 SQ M-ARVRAT: 53 ML/MIN
GLUCOSE SERPL-MCNC: 138 MG/DL (ref 74–106)
MAGNESIUM SERPL-MCNC: 1.1 MG/DL (ref 1.8–2.4)
MONOCYTES NFR BLD: 44 % (ref 0–7)
NEUTS BAND NFR BLD: 6 % (ref 0–10)
NEUTS SEG NFR BLD MANUAL: 41 % (ref 37–75)
OVALOCYTES BLD QL SMEAR: (no result)
PATH REV BLD -IMP: YES
PHOSPHATE SERPL-MCNC: 2.8 MG/DL (ref 2.5–4.9)
PLAT MORPH BLD: (no result)
PLATELET # BLD: 71 X10^3MCL (ref 130–400)
POTASSIUM SERPL-SCNC: 3.4 MMOL/L (ref 3.5–5.1)
RBC MORPH BLD: (no result)
SODIUM SERPL-SCNC: 138 MMOL/L (ref 136–145)

## 2017-09-23 VITALS — DIASTOLIC BLOOD PRESSURE: 56 MMHG | SYSTOLIC BLOOD PRESSURE: 107 MMHG

## 2017-09-23 VITALS — DIASTOLIC BLOOD PRESSURE: 61 MMHG | SYSTOLIC BLOOD PRESSURE: 103 MMHG

## 2017-09-23 VITALS — SYSTOLIC BLOOD PRESSURE: 137 MMHG | DIASTOLIC BLOOD PRESSURE: 72 MMHG

## 2017-09-23 VITALS — DIASTOLIC BLOOD PRESSURE: 53 MMHG | SYSTOLIC BLOOD PRESSURE: 106 MMHG

## 2017-09-23 VITALS — DIASTOLIC BLOOD PRESSURE: 57 MMHG | SYSTOLIC BLOOD PRESSURE: 101 MMHG

## 2017-09-23 LAB
BASOPHILS NFR BLD: 0 % (ref 0–2)
BASOPHILS NFR BLD: 0 % (ref 0–2)
BUN SERPL-MCNC: 13 MG/DL (ref 7–18)
CALCIUM SERPL-MCNC: 9.1 MG/DL (ref 8.5–10.1)
CHLORIDE SERPL-SCNC: 111 MMOL/L (ref 98–107)
CO2 SERPL-SCNC: 20.3 MMOL/L (ref 21–32)
CREAT SERPL-MCNC: 1 MG/DL (ref 0.6–1)
ERYTHROCYTE [DISTWIDTH] IN BLOOD BY AUTOMATED COUNT: 23.9 % (ref 11.5–14.5)
ERYTHROCYTE [DISTWIDTH] IN BLOOD BY AUTOMATED COUNT: 24.1 % (ref 11.5–14.5)
GFR SERPLBLD BASED ON 1.73 SQ M-ARVRAT: 60 ML/MIN
GLUCOSE SERPL-MCNC: 84 MG/DL (ref 74–106)
MAGNESIUM SERPL-MCNC: 2.1 MG/DL (ref 1.8–2.4)
MONOCYTES NFR BLD: 12 % (ref 0–7)
MONOCYTES NFR BLD: 22 % (ref 0–7)
NEUTS BAND NFR BLD: 5 % (ref 0–10)
NEUTS BAND NFR BLD: 7 % (ref 0–10)
NEUTS SEG NFR BLD MANUAL: 70 % (ref 37–75)
NEUTS SEG NFR BLD MANUAL: 77 % (ref 37–75)
PHOSPHATE SERPL-MCNC: 3.2 MG/DL (ref 2.5–4.9)
PLAT MORPH BLD: (no result)
PLAT MORPH BLD: (no result)
PLATELET # BLD: 82 X10^3MCL (ref 130–400)
PLATELET # BLD: 87 X10^3MCL (ref 130–400)
POTASSIUM SERPL-SCNC: 3.4 MMOL/L (ref 3.5–5.1)
RBC MORPH BLD: (no result)
RBC MORPH BLD: (no result)
SODIUM SERPL-SCNC: 141 MMOL/L (ref 136–145)
URATE SERPL-MCNC: 5 MG/DL (ref 2.6–6)

## 2017-09-24 VITALS — SYSTOLIC BLOOD PRESSURE: 129 MMHG | DIASTOLIC BLOOD PRESSURE: 70 MMHG

## 2017-09-24 VITALS — SYSTOLIC BLOOD PRESSURE: 120 MMHG | DIASTOLIC BLOOD PRESSURE: 63 MMHG

## 2017-09-24 VITALS — SYSTOLIC BLOOD PRESSURE: 94 MMHG | DIASTOLIC BLOOD PRESSURE: 57 MMHG

## 2017-09-24 VITALS — SYSTOLIC BLOOD PRESSURE: 118 MMHG | DIASTOLIC BLOOD PRESSURE: 69 MMHG

## 2017-09-24 LAB
BUN SERPL-MCNC: 10 MG/DL (ref 7–18)
CALCIUM SERPL-MCNC: 9 MG/DL (ref 8.5–10.1)
CHLORIDE SERPL-SCNC: 111 MMOL/L (ref 98–107)
CO2 SERPL-SCNC: 18.3 MMOL/L (ref 21–32)
CREAT SERPL-MCNC: 1 MG/DL (ref 0.6–1)
GFR SERPLBLD BASED ON 1.73 SQ M-ARVRAT: 60 ML/MIN
GLUCOSE SERPL-MCNC: 66 MG/DL (ref 74–106)
POTASSIUM SERPL-SCNC: 2.7 MMOL/L (ref 3.5–5.1)
SODIUM SERPL-SCNC: 139 MMOL/L (ref 136–145)

## 2017-12-31 ENCOUNTER — HOSPITAL ENCOUNTER (EMERGENCY)
Dept: HOSPITAL 1 - ED | Age: 62
LOS: 1 days | Discharge: LEFT BEFORE DISCHARGE | End: 2018-01-01
Payer: MEDICAID

## 2017-12-31 VITALS — SYSTOLIC BLOOD PRESSURE: 124 MMHG | DIASTOLIC BLOOD PRESSURE: 74 MMHG

## 2017-12-31 DIAGNOSIS — Z53.21: Primary | ICD-10-CM

## 2018-01-01 ENCOUNTER — HOSPITAL ENCOUNTER (EMERGENCY)
Dept: HOSPITAL 1 - ED | Age: 63
Discharge: HOME | End: 2018-01-01
Payer: MEDICAID

## 2018-01-01 VITALS — WEIGHT: 92 LBS | HEIGHT: 58 IN | BODY MASS INDEX: 19.31 KG/M2

## 2018-01-01 VITALS — SYSTOLIC BLOOD PRESSURE: 129 MMHG | DIASTOLIC BLOOD PRESSURE: 72 MMHG

## 2018-01-01 DIAGNOSIS — Z94.0: ICD-10-CM

## 2018-01-01 DIAGNOSIS — J20.9: Primary | ICD-10-CM

## 2018-01-01 DIAGNOSIS — Z99.2: ICD-10-CM

## 2018-01-01 DIAGNOSIS — I10: ICD-10-CM

## 2018-01-01 DIAGNOSIS — Z87.42: ICD-10-CM

## 2018-01-01 DIAGNOSIS — E78.00: ICD-10-CM

## 2018-01-01 DIAGNOSIS — E07.9: ICD-10-CM

## 2018-01-01 DIAGNOSIS — N39.0: ICD-10-CM

## 2018-08-16 ENCOUNTER — HOSPITAL ENCOUNTER (INPATIENT)
Dept: HOSPITAL 1 - ED | Age: 63
LOS: 2 days | Discharge: HOME | DRG: 247 | End: 2018-08-18
Attending: INTERNAL MEDICINE | Admitting: INTERNAL MEDICINE
Payer: MEDICAID

## 2018-08-16 VITALS — SYSTOLIC BLOOD PRESSURE: 155 MMHG | DIASTOLIC BLOOD PRESSURE: 95 MMHG

## 2018-08-16 VITALS — HEIGHT: 57 IN | BODY MASS INDEX: 21.66 KG/M2 | WEIGHT: 100.38 LBS

## 2018-08-16 VITALS — DIASTOLIC BLOOD PRESSURE: 88 MMHG | SYSTOLIC BLOOD PRESSURE: 142 MMHG

## 2018-08-16 VITALS — SYSTOLIC BLOOD PRESSURE: 171 MMHG | DIASTOLIC BLOOD PRESSURE: 105 MMHG

## 2018-08-16 DIAGNOSIS — E78.5: ICD-10-CM

## 2018-08-16 DIAGNOSIS — I25.2: ICD-10-CM

## 2018-08-16 DIAGNOSIS — Z94.0: ICD-10-CM

## 2018-08-16 DIAGNOSIS — E83.52: ICD-10-CM

## 2018-08-16 DIAGNOSIS — K56.609: Primary | ICD-10-CM

## 2018-08-16 DIAGNOSIS — N17.0: ICD-10-CM

## 2018-08-16 DIAGNOSIS — K55.9: ICD-10-CM

## 2018-08-16 DIAGNOSIS — Z85.72: ICD-10-CM

## 2018-08-16 DIAGNOSIS — K45.8: ICD-10-CM

## 2018-08-16 LAB
ALBUMIN SERPL-MCNC: 4.2 G/DL (ref 3.4–5)
ALP SERPL-CCNC: 170 U/L (ref 46–116)
ALT SERPL-CCNC: 28 U/L (ref 14–59)
AST SERPL-CCNC: 29 U/L (ref 15–37)
BASOPHILS NFR BLD: 1 % (ref 0–2)
BILIRUB SERPL-MCNC: 0.45 MG/DL (ref 0.2–1)
BUN SERPL-MCNC: 19 MG/DL (ref 7–18)
CALCIUM SERPL-MCNC: 10.7 MG/DL (ref 8.5–10.1)
CHLORIDE SERPL-SCNC: 105 MMOL/L (ref 98–107)
CO2 SERPL-SCNC: 27.9 MMOL/L (ref 21–32)
CREAT SERPL-MCNC: 1.5 MG/DL (ref 0.6–1)
ERYTHROCYTE [DISTWIDTH] IN BLOOD BY AUTOMATED COUNT: 16 % (ref 11.5–14.5)
GFR SERPLBLD BASED ON 1.73 SQ M-ARVRAT: 37 ML/MIN
GLUCOSE SERPL-MCNC: 137 MG/DL (ref 74–106)
LIPASE SERPL-CCNC: 299 IU/L (ref 73–393)
MICROSCOPIC UR-IMP: YES
PLATELET # BLD: 260 X10^3MCL (ref 130–400)
POTASSIUM SERPL-SCNC: 4 MMOL/L (ref 3.5–5.1)
PROT SERPL-MCNC: 8.1 G/DL (ref 6.4–8.2)
RBC # UR STRIP.AUTO: NEGATIVE /UL
SODIUM SERPL-SCNC: 141 MMOL/L (ref 136–145)
UA SPECIFIC GRAVITY: 1.02 (ref 1–1.03)

## 2018-08-16 PROCEDURE — C9113 INJ PANTOPRAZOLE SODIUM, VIA: HCPCS

## 2018-08-17 VITALS — SYSTOLIC BLOOD PRESSURE: 168 MMHG | DIASTOLIC BLOOD PRESSURE: 86 MMHG

## 2018-08-17 VITALS — SYSTOLIC BLOOD PRESSURE: 133 MMHG | DIASTOLIC BLOOD PRESSURE: 84 MMHG

## 2018-08-17 VITALS — DIASTOLIC BLOOD PRESSURE: 92 MMHG | SYSTOLIC BLOOD PRESSURE: 162 MMHG

## 2018-08-17 VITALS — SYSTOLIC BLOOD PRESSURE: 170 MMHG | DIASTOLIC BLOOD PRESSURE: 82 MMHG

## 2018-08-17 VITALS — SYSTOLIC BLOOD PRESSURE: 147 MMHG | DIASTOLIC BLOOD PRESSURE: 82 MMHG

## 2018-08-17 LAB
BASOPHILS NFR BLD: 0 % (ref 0–2)
BUN SERPL-MCNC: 20 MG/DL (ref 7–18)
CALCIUM SERPL-MCNC: 8.9 MG/DL (ref 8.5–10.1)
CHLORIDE SERPL-SCNC: 107 MMOL/L (ref 98–107)
CO2 SERPL-SCNC: 25.1 MMOL/L (ref 21–32)
CREAT SERPL-MCNC: 1.4 MG/DL (ref 0.6–1)
ERYTHROCYTE [DISTWIDTH] IN BLOOD BY AUTOMATED COUNT: 15.9 % (ref 11.5–14.5)
GFR SERPLBLD BASED ON 1.73 SQ M-ARVRAT: 40 ML/MIN
GLUCOSE SERPL-MCNC: 90 MG/DL (ref 74–106)
PLATELET # BLD: 206 X10^3MCL (ref 130–400)
POTASSIUM SERPL-SCNC: 3.9 MMOL/L (ref 3.5–5.1)
SODIUM SERPL-SCNC: 142 MMOL/L (ref 136–145)

## 2018-08-18 VITALS — SYSTOLIC BLOOD PRESSURE: 116 MMHG | DIASTOLIC BLOOD PRESSURE: 62 MMHG

## 2018-08-18 VITALS — SYSTOLIC BLOOD PRESSURE: 169 MMHG | DIASTOLIC BLOOD PRESSURE: 88 MMHG

## 2018-08-18 VITALS — SYSTOLIC BLOOD PRESSURE: 113 MMHG | DIASTOLIC BLOOD PRESSURE: 64 MMHG

## 2018-08-18 VITALS — SYSTOLIC BLOOD PRESSURE: 159 MMHG | DIASTOLIC BLOOD PRESSURE: 83 MMHG

## 2018-08-18 LAB
BASOPHILS NFR BLD: 0.4 % (ref 0–2)
BUN SERPL-MCNC: 15 MG/DL (ref 7–18)
CALCIUM SERPL-MCNC: 9.1 MG/DL (ref 8.5–10.1)
CHLORIDE SERPL-SCNC: 109 MMOL/L (ref 98–107)
CO2 SERPL-SCNC: 22 MMOL/L (ref 21–32)
CREAT SERPL-MCNC: 1.3 MG/DL (ref 0.6–1)
ERYTHROCYTE [DISTWIDTH] IN BLOOD BY AUTOMATED COUNT: 16.1 % (ref 11.5–14.5)
GFR SERPLBLD BASED ON 1.73 SQ M-ARVRAT: 44 ML/MIN
GLUCOSE SERPL-MCNC: 92 MG/DL (ref 74–106)
MAGNESIUM SERPL-MCNC: 1.6 MG/DL (ref 1.8–2.4)
PHOSPHATE SERPL-MCNC: 3.2 MG/DL (ref 2.5–4.9)
PLATELET # BLD: 150 X10^3MCL (ref 130–400)
POTASSIUM SERPL-SCNC: 3.6 MMOL/L (ref 3.5–5.1)
SODIUM SERPL-SCNC: 141 MMOL/L (ref 136–145)

## 2018-11-23 ENCOUNTER — HOSPITAL ENCOUNTER (EMERGENCY)
Dept: HOSPITAL 1 - ED | Age: 63
Discharge: HOME | End: 2018-11-23
Payer: MEDICAID

## 2018-11-23 VITALS — WEIGHT: 111 LBS | BODY MASS INDEX: 23.3 KG/M2 | HEIGHT: 58 IN

## 2018-11-23 VITALS — DIASTOLIC BLOOD PRESSURE: 70 MMHG | SYSTOLIC BLOOD PRESSURE: 117 MMHG

## 2018-11-23 DIAGNOSIS — E78.00: ICD-10-CM

## 2018-11-23 DIAGNOSIS — Z94.0: ICD-10-CM

## 2018-11-23 DIAGNOSIS — Z98.890: ICD-10-CM

## 2018-11-23 DIAGNOSIS — I10: ICD-10-CM

## 2018-11-23 DIAGNOSIS — N39.0: Primary | ICD-10-CM

## 2019-01-14 ENCOUNTER — HOSPITAL ENCOUNTER (INPATIENT)
Dept: HOSPITAL 1 - ED | Age: 64
LOS: 2 days | Discharge: HOME | DRG: 720 | End: 2019-01-16
Attending: HOSPITALIST | Admitting: HOSPITALIST
Payer: MEDICAID

## 2019-01-14 VITALS — BODY MASS INDEX: 19.21 KG/M2 | WEIGHT: 112.5 LBS | HEIGHT: 64 IN

## 2019-01-14 VITALS — SYSTOLIC BLOOD PRESSURE: 120 MMHG | DIASTOLIC BLOOD PRESSURE: 57 MMHG

## 2019-01-14 VITALS — DIASTOLIC BLOOD PRESSURE: 56 MMHG | SYSTOLIC BLOOD PRESSURE: 112 MMHG

## 2019-01-14 DIAGNOSIS — I10: ICD-10-CM

## 2019-01-14 DIAGNOSIS — A41.9: Primary | ICD-10-CM

## 2019-01-14 DIAGNOSIS — F32.9: ICD-10-CM

## 2019-01-14 DIAGNOSIS — Z79.82: ICD-10-CM

## 2019-01-14 DIAGNOSIS — Z94.0: ICD-10-CM

## 2019-01-14 DIAGNOSIS — N17.0: ICD-10-CM

## 2019-01-14 DIAGNOSIS — R80.9: ICD-10-CM

## 2019-01-14 DIAGNOSIS — Z79.899: ICD-10-CM

## 2019-01-14 DIAGNOSIS — K21.9: ICD-10-CM

## 2019-01-14 DIAGNOSIS — E87.1: ICD-10-CM

## 2019-01-14 DIAGNOSIS — J06.9: ICD-10-CM

## 2019-01-14 DIAGNOSIS — Z92.21: ICD-10-CM

## 2019-01-14 DIAGNOSIS — Z85.72: ICD-10-CM

## 2019-01-14 DIAGNOSIS — D63.8: ICD-10-CM

## 2019-01-14 DIAGNOSIS — Z79.52: ICD-10-CM

## 2019-01-14 LAB
ALBUMIN SERPL-MCNC: 3.6 G/DL (ref 3.4–5)
ALP SERPL-CCNC: 108 U/L (ref 46–116)
ALT SERPL-CCNC: 21 U/L (ref 14–59)
AST SERPL-CCNC: 24 U/L (ref 15–37)
BASOPHILS NFR BLD: 0 % (ref 0–2)
BILIRUB SERPL-MCNC: 0.6 MG/DL (ref 0.2–1)
BUN SERPL-MCNC: 20 MG/DL (ref 7–18)
CALCIUM SERPL-MCNC: 9.4 MG/DL (ref 8.5–10.1)
CHLORIDE SERPL-SCNC: 99 MMOL/L (ref 98–107)
CHOLEST SERPL-MCNC: 179 MG/DL (ref ?–200)
CHOLEST/HDLC SERPL: 3 MG/DL
CO2 SERPL-SCNC: 24.6 MMOL/L (ref 21–32)
CREAT SERPL-MCNC: 1.7 MG/DL (ref 0.6–1)
ERYTHROCYTE [DISTWIDTH] IN BLOOD BY AUTOMATED COUNT: 17 % (ref 11.5–14.5)
GFR SERPLBLD BASED ON 1.73 SQ M-ARVRAT: 32 ML/MIN
GLUCOSE SERPL-MCNC: 96 MG/DL (ref 74–106)
HDLC SERPL-MCNC: 59 MG/DL (ref 40–60)
MAGNESIUM SERPL-MCNC: 1.8 MG/DL (ref 1.8–2.4)
MICROSCOPIC UR-IMP: YES
PHOSPHATE SERPL-MCNC: 3.2 MG/DL (ref 2.5–4.9)
PLATELET # BLD: 145 X10^3MCL (ref 130–400)
POTASSIUM SERPL-SCNC: 4.3 MMOL/L (ref 3.5–5.1)
PROT SERPL-MCNC: 7.2 G/DL (ref 6.4–8.2)
RBC # UR STRIP.AUTO: (no result) /UL
SODIUM SERPL-SCNC: 131 MMOL/L (ref 136–145)
TRIGL SERPL-MCNC: 103 MG/DL (ref ?–150)
UA SPECIFIC GRAVITY: 1.02 (ref 1–1.03)

## 2019-01-15 VITALS — DIASTOLIC BLOOD PRESSURE: 71 MMHG | SYSTOLIC BLOOD PRESSURE: 140 MMHG

## 2019-01-15 VITALS — SYSTOLIC BLOOD PRESSURE: 134 MMHG | DIASTOLIC BLOOD PRESSURE: 68 MMHG

## 2019-01-15 VITALS — DIASTOLIC BLOOD PRESSURE: 61 MMHG | SYSTOLIC BLOOD PRESSURE: 110 MMHG

## 2019-01-15 VITALS — DIASTOLIC BLOOD PRESSURE: 67 MMHG | SYSTOLIC BLOOD PRESSURE: 125 MMHG

## 2019-01-15 VITALS — DIASTOLIC BLOOD PRESSURE: 62 MMHG | SYSTOLIC BLOOD PRESSURE: 114 MMHG

## 2019-01-15 LAB
BASOPHILS NFR BLD: 0.3 % (ref 0–2)
BUN SERPL-MCNC: 20 MG/DL (ref 7–18)
CALCIUM SERPL-MCNC: 9.2 MG/DL (ref 8.5–10.1)
CHLORIDE SERPL-SCNC: 106 MMOL/L (ref 98–107)
CO2 SERPL-SCNC: 21.7 MMOL/L (ref 21–32)
CREAT SERPL-MCNC: 1.5 MG/DL (ref 0.6–1)
ERYTHROCYTE [DISTWIDTH] IN BLOOD BY AUTOMATED COUNT: 17.1 % (ref 11.5–14.5)
GFR SERPLBLD BASED ON 1.73 SQ M-ARVRAT: 37 ML/MIN
GLUCOSE SERPL-MCNC: 85 MG/DL (ref 74–106)
MAGNESIUM SERPL-MCNC: 2 MG/DL (ref 1.8–2.4)
MICROSCOPIC UR-IMP: YES
PHOSPHATE SERPL-MCNC: 4 MG/DL (ref 2.5–4.9)
PLATELET # BLD: 126 X10^3MCL (ref 130–400)
POTASSIUM SERPL-SCNC: 4.1 MMOL/L (ref 3.5–5.1)
RBC # UR STRIP.AUTO: (no result) /UL
SODIUM SERPL-SCNC: 138 MMOL/L (ref 136–145)
UA SPECIFIC GRAVITY: 1.02 (ref 1–1.03)

## 2019-01-16 VITALS — DIASTOLIC BLOOD PRESSURE: 73 MMHG | SYSTOLIC BLOOD PRESSURE: 137 MMHG

## 2019-01-16 VITALS — DIASTOLIC BLOOD PRESSURE: 76 MMHG | SYSTOLIC BLOOD PRESSURE: 145 MMHG

## 2019-01-16 VITALS — SYSTOLIC BLOOD PRESSURE: 137 MMHG | DIASTOLIC BLOOD PRESSURE: 73 MMHG

## 2019-01-16 LAB
BUN SERPL-MCNC: 19 MG/DL (ref 7–18)
CALCIUM SERPL-MCNC: 9.4 MG/DL (ref 8.5–10.1)
CHLORIDE SERPL-SCNC: 108 MMOL/L (ref 98–107)
CO2 SERPL-SCNC: 17.5 MMOL/L (ref 21–32)
CREAT SERPL-MCNC: 1.4 MG/DL (ref 0.6–1)
ERYTHROCYTE [DISTWIDTH] IN BLOOD BY AUTOMATED COUNT: 17.3 % (ref 11.5–14.5)
GFR SERPLBLD BASED ON 1.73 SQ M-ARVRAT: 40 ML/MIN
GLUCOSE SERPL-MCNC: 76 MG/DL (ref 74–106)
MAGNESIUM SERPL-MCNC: 1.8 MG/DL (ref 1.8–2.4)
MONOCYTES NFR BLD: 11 % (ref 0–7)
NEUTS BAND NFR BLD: 5 % (ref 0–10)
NEUTS SEG NFR BLD MANUAL: 60 % (ref 37–75)
OVALOCYTES BLD QL SMEAR: (no result)
PHOSPHATE SERPL-MCNC: 3.2 MG/DL (ref 2.5–4.9)
PLAT MORPH BLD: (no result)
PLATELET # BLD: 130 X10^3MCL (ref 130–400)
POTASSIUM SERPL-SCNC: 3.5 MMOL/L (ref 3.5–5.1)
RBC MORPH BLD: (no result)
SODIUM SERPL-SCNC: 137 MMOL/L (ref 136–145)

## 2019-01-23 ENCOUNTER — HOSPITAL ENCOUNTER (EMERGENCY)
Dept: HOSPITAL 1 - ED | Age: 64
Discharge: ANOTHER HOSPITAL | End: 2019-01-23
Payer: MEDICAID

## 2019-01-23 VITALS — SYSTOLIC BLOOD PRESSURE: 131 MMHG | DIASTOLIC BLOOD PRESSURE: 87 MMHG

## 2019-01-23 VITALS
WEIGHT: 112 LBS | BODY MASS INDEX: 20.61 KG/M2 | HEIGHT: 62 IN | WEIGHT: 112 LBS | HEIGHT: 62 IN | BODY MASS INDEX: 20.61 KG/M2

## 2019-01-23 DIAGNOSIS — I12.0: ICD-10-CM

## 2019-01-23 DIAGNOSIS — E78.00: ICD-10-CM

## 2019-01-23 DIAGNOSIS — N18.6: ICD-10-CM

## 2019-01-23 DIAGNOSIS — K56.609: Primary | ICD-10-CM

## 2019-01-23 DIAGNOSIS — Z98.890: ICD-10-CM

## 2019-01-23 LAB
ALBUMIN SERPL-MCNC: 3.9 G/DL (ref 3.4–5)
ALP SERPL-CCNC: 114 U/L (ref 46–116)
ALT SERPL-CCNC: 21 U/L (ref 14–59)
AMYLASE SERPL-CCNC: 110 U/L (ref 25–115)
AST SERPL-CCNC: 20 U/L (ref 15–37)
BASOPHILS NFR BLD: 0.3 % (ref 0–2)
BILIRUB SERPL-MCNC: 0.4 MG/DL (ref 0.2–1)
BUN SERPL-MCNC: 23 MG/DL (ref 7–18)
CALCIUM SERPL-MCNC: 10.1 MG/DL (ref 8.5–10.1)
CHLORIDE SERPL-SCNC: 103 MMOL/L (ref 98–107)
CO2 SERPL-SCNC: 26.4 MMOL/L (ref 21–32)
CREAT SERPL-MCNC: 1.9 MG/DL (ref 0.6–1)
ERYTHROCYTE [DISTWIDTH] IN BLOOD BY AUTOMATED COUNT: 17.7 % (ref 11.5–14.5)
GFR SERPLBLD BASED ON 1.73 SQ M-ARVRAT: 28 ML/MIN
GLUCOSE SERPL-MCNC: 127 MG/DL (ref 74–106)
LIPASE SERPL-CCNC: 304 IU/L (ref 73–393)
MICROSCOPIC UR-IMP: YES
PLATELET # BLD: 290 X10^3MCL (ref 130–400)
POTASSIUM SERPL-SCNC: 3.8 MMOL/L (ref 3.5–5.1)
PROT SERPL-MCNC: 7.9 G/DL (ref 6.4–8.2)
RBC # UR STRIP.AUTO: NEGATIVE /UL
SODIUM SERPL-SCNC: 138 MMOL/L (ref 136–145)
UA SPECIFIC GRAVITY: >=1.03 (ref 1–1.03)

## 2019-03-02 NOTE — NUR
PT SEEN, RESTING IN BED, ALERT AND ORIENTED, DENIES HEADACHE OR DIZZINESS,
[place] Islander SPEAKING ONLY, BREATHING EVEN AND UNLABORED, LUNG SOUNDS CLEAR, ON
ROOM AIR WITH NO RESP DISTRESS NOTED, IVF INFUSING WELL TO LEFT CHEST CLOTILDE
CATH, ON TELE#19 NSR, DENIES CHEST PAIN, PULSES PALPABLE, NO EDEMA NOTED, SCD
TO BLE, GENERALIZED WEAKNESS, ABD SOFT WITH ACTIVE BS, NO BM AT THIS TIME,
DENIES ABD PAIN, STILL HAVING ON AND OFF DIARRHEA, VOIDING FREELY, BUE WITH
DISCOLORATION, NO DISTRESS NOTED, WILL KEEP TO MONITOR. oriented to person, place and time

## 2020-06-26 NOTE — NUR
MED REC UPDATED WITH PT RX BOTTLES Spoke with pt--appt made for 6/29/20 with JF for ingrown toenail

## 2022-04-06 NOTE — NUR
PT IS CURRENLY ASLEEP AND RESTING WELL. NO ACUTE DISTRESS NOTED. BREATHING
EVEN AND UNLABORED. IV INTACT AND PATENT. WILL CONT TO MONITOR. No